# Patient Record
Sex: MALE | URBAN - METROPOLITAN AREA
[De-identification: names, ages, dates, MRNs, and addresses within clinical notes are randomized per-mention and may not be internally consistent; named-entity substitution may affect disease eponyms.]

---

## 2023-04-20 ENCOUNTER — OFFICE VISIT (OUTPATIENT)
Dept: URBAN - METROPOLITAN AREA CLINIC 5 | Facility: CLINIC | Age: 63
End: 2023-04-20
Payer: COMMERCIAL

## 2023-04-20 DIAGNOSIS — H02.88B MEIBOMIAN GLAND DYSFNCT LEFT EYE, UPPER AND LOWER EYELIDS: ICD-10-CM

## 2023-04-20 DIAGNOSIS — H02.88A MEIBOMIAN GLAND DYSFNCT RIGHT EYE, UPPER AND LOWER EYELIDS: ICD-10-CM

## 2023-04-20 DIAGNOSIS — H16.143 PUNCTATE KERATITIS, BILATERAL: Primary | ICD-10-CM

## 2023-04-20 DIAGNOSIS — H25.13 AGE-RELATED NUCLEAR CATARACT, BILATERAL: ICD-10-CM

## 2023-04-20 PROCEDURE — 92004 COMPRE OPH EXAM NEW PT 1/>: CPT | Performed by: OPTOMETRIST

## 2023-04-20 ASSESSMENT — INTRAOCULAR PRESSURE
OS: 13
OD: 11

## 2023-04-20 NOTE — IMPRESSION/PLAN
Impression: Meibomian gland dysfnct right eye, upper and lower eyelids: H02.88A. Plan: Meibomian gland dysfunction bilateral upper and lower lids - glands expressed in office. Use hot compresses and perform lid hygiene daily. Discussed with the patient benefits of flaxseed oil or omega 3 supplements.

## 2023-04-20 NOTE — IMPRESSION/PLAN
Impression: Punctate keratitis, bilateral: H16.143. Plan: Moderate dry eye both eyes - Recommend use of high quality artificial tears 3-4 x daily. If does not improve will consider placing punctal plugs and/or adding Restasis. Follow up in 2-3 months.

## 2023-04-20 NOTE — IMPRESSION/PLAN
Impression: Meibomian gland dysfnct left eye, upper and lower eyelids: H02.88B.  Plan: see above plan